# Patient Record
Sex: MALE | Race: WHITE | NOT HISPANIC OR LATINO | Employment: FULL TIME | URBAN - METROPOLITAN AREA
[De-identification: names, ages, dates, MRNs, and addresses within clinical notes are randomized per-mention and may not be internally consistent; named-entity substitution may affect disease eponyms.]

---

## 2023-03-20 ENCOUNTER — HOSPITAL ENCOUNTER (OUTPATIENT)
Dept: RESEARCH | Facility: CLINIC | Age: 68
Discharge: HOME | End: 2023-03-20

## 2023-03-20 ENCOUNTER — LAB REQUISITION (OUTPATIENT)
Dept: LAB | Facility: CLINIC | Age: 68
End: 2023-03-20

## 2023-03-20 LAB
CHOLEST SERPL-MCNC: 220 MG/DL
CRP SERPL HS-MCNC: 1.49 MG/L
ERYTHROCYTE [DISTWIDTH] IN BLOOD BY AUTOMATED COUNT: 12.3 % (ref 10–15)
FASTING STATUS PATIENT QL REPORTED: NORMAL
GLUCOSE SERPL-MCNC: 97 MG/DL (ref 70–99)
HBA1C MFR BLD: 5.6 %
HCT VFR BLD AUTO: 45 % (ref 40–53)
HDLC SERPL-MCNC: 43 MG/DL
HGB BLD-MCNC: 14.8 G/DL (ref 13.3–17.7)
HOLD SPECIMEN: NORMAL
INSULIN SERPL-ACNC: 12.1 UU/ML (ref 2.6–24.9)
LDLC SERPL CALC-MCNC: 159 MG/DL
MCH RBC QN AUTO: 31.1 PG (ref 26.5–33)
MCHC RBC AUTO-ENTMCNC: 32.9 G/DL (ref 31.5–36.5)
MCV RBC AUTO: 95 FL (ref 78–100)
NONHDLC SERPL-MCNC: 177 MG/DL
PLATELET # BLD AUTO: 200 10E3/UL (ref 150–450)
RBC # BLD AUTO: 4.76 10E6/UL (ref 4.4–5.9)
T4 FREE SERPL-MCNC: 1.09 NG/DL (ref 0.9–1.7)
TRIGL SERPL-MCNC: 90 MG/DL
TSH SERPL DL<=0.005 MIU/L-ACNC: 2.67 UIU/ML (ref 0.3–4.2)
WBC # BLD AUTO: 6.2 10E3/UL (ref 4–11)

## 2023-03-20 PROCEDURE — 86141 C-REACTIVE PROTEIN HS: CPT | Performed by: INTERNAL MEDICINE

## 2023-03-20 PROCEDURE — 83036 HEMOGLOBIN GLYCOSYLATED A1C: CPT | Performed by: INTERNAL MEDICINE

## 2023-03-20 PROCEDURE — 84439 ASSAY OF FREE THYROXINE: CPT | Performed by: INTERNAL MEDICINE

## 2023-03-20 PROCEDURE — 82947 ASSAY GLUCOSE BLOOD QUANT: CPT | Performed by: INTERNAL MEDICINE

## 2023-03-20 PROCEDURE — 83525 ASSAY OF INSULIN: CPT | Performed by: INTERNAL MEDICINE

## 2023-03-20 PROCEDURE — 510N000009 HC RESEARCH FACILITY, PER 15 MIN

## 2023-03-20 PROCEDURE — 510N000017 HC CRU PATIENT CARE, PER 15 MIN

## 2023-03-20 PROCEDURE — 80061 LIPID PANEL: CPT | Performed by: INTERNAL MEDICINE

## 2023-03-20 PROCEDURE — 85027 COMPLETE CBC AUTOMATED: CPT | Performed by: INTERNAL MEDICINE

## 2023-03-20 PROCEDURE — 84443 ASSAY THYROID STIM HORMONE: CPT | Performed by: INTERNAL MEDICINE

## 2023-03-20 PROCEDURE — 300N000004 HC RESEARCH SPECIMEN PROCESSING, MODERATE

## 2023-03-20 NOTE — ADDENDUM NOTE
Encounter addended by: Arturo Montero on: 3/20/2023 4:15 PM   Actions taken: Charge Capture section accepted

## 2023-03-21 NOTE — ADDENDUM NOTE
Encounter addended by: Kisha Zuñiga RN on: 3/21/2023 6:23 AM   Actions taken: Charge Capture section accepted

## 2023-09-29 ENCOUNTER — TELEPHONE (OUTPATIENT)
Dept: INFECTIOUS DISEASES | Facility: CLINIC | Age: 68
End: 2023-09-29
Payer: COMMERCIAL

## 2023-09-29 NOTE — TELEPHONE ENCOUNTER
Spoke to pt and informed, need records from visits where pt was seen for low grade fevers, need notes. And notes of abx pt was placed on and testing.

## 2023-11-11 ENCOUNTER — HEALTH MAINTENANCE LETTER (OUTPATIENT)
Age: 68
End: 2023-11-11

## 2024-12-28 ENCOUNTER — HEALTH MAINTENANCE LETTER (OUTPATIENT)
Age: 69
End: 2024-12-28

## 2025-06-20 ENCOUNTER — OFFICE VISIT (OUTPATIENT)
Dept: URGENT CARE | Facility: URGENT CARE | Age: 70
End: 2025-06-20
Payer: COMMERCIAL

## 2025-06-20 VITALS
RESPIRATION RATE: 16 BRPM | TEMPERATURE: 98.8 F | WEIGHT: 191 LBS | OXYGEN SATURATION: 98 % | SYSTOLIC BLOOD PRESSURE: 132 MMHG | DIASTOLIC BLOOD PRESSURE: 83 MMHG | HEART RATE: 58 BPM

## 2025-06-20 DIAGNOSIS — L08.9 LOCAL INFECTION OF WOUND: Primary | ICD-10-CM

## 2025-06-20 DIAGNOSIS — T14.8XXA LOCAL INFECTION OF WOUND: Primary | ICD-10-CM

## 2025-06-20 PROCEDURE — 99203 OFFICE O/P NEW LOW 30 MIN: CPT | Performed by: FAMILY MEDICINE

## 2025-06-20 PROCEDURE — 3075F SYST BP GE 130 - 139MM HG: CPT | Performed by: FAMILY MEDICINE

## 2025-06-20 PROCEDURE — 3079F DIAST BP 80-89 MM HG: CPT | Performed by: FAMILY MEDICINE

## 2025-06-20 RX ORDER — DOXYCYCLINE 100 MG/1
100 TABLET ORAL 2 TIMES DAILY
Qty: 20 TABLET | Refills: 0 | Status: SHIPPED | OUTPATIENT
Start: 2025-06-20 | End: 2025-06-30

## 2025-06-20 RX ORDER — DIPHENOXYLATE HYDROCHLORIDE AND ATROPINE SULFATE 2.5; .025 MG/1; MG/1
1 TABLET ORAL DAILY
COMMUNITY

## 2025-06-20 RX ORDER — CHLORAL HYDRATE 500 MG
2 CAPSULE ORAL 2 TIMES DAILY
COMMUNITY

## 2025-06-21 NOTE — PROGRESS NOTES
Urgent Care Clinic Visit    Chief Complaint   Patient presents with    Urgent Care     Pt states he cut his lower R leg and had it glued and given a Tdap-this has been a week since it happened now having some redness and pain                6/20/2025     7:51 PM   Additional Questions   Roomed by Ky   Accompanied by self

## 2025-06-23 NOTE — PROGRESS NOTES
SUBJECTIVE: Sarthak Orta is a 70 year old male presenting with a chief complaint of redness around wound.  Onset of symptoms was day(s) ago.  Course of illness is worsening.      No past medical history on file.  Allergies   Allergen Reactions    Penicillins Rash     When I was 4 yrs old     Social History     Tobacco Use    Smoking status: Not on file    Smokeless tobacco: Not on file   Substance Use Topics    Alcohol use: Not on file       ROS:  SKIN: no rash  GI: no vomiting    OBJECTIVE:  /83   Pulse 58   Temp 98.8  F (37.1  C) (Tympanic)   Resp 16   Wt 86.6 kg (191 lb)   SpO2 98% GENERAL APPEARANCE: healthy, alert and no distress  SKIN: rt lower ext shin with closed wound and slight redness      ICD-10-CM    1. Local infection of wound  T14.8XXA doxycycline monohydrate (ADOXA) 100 MG tablet    L08.9           Fluids/Rest, f/u if worse/not any better